# Patient Record
Sex: FEMALE
[De-identification: names, ages, dates, MRNs, and addresses within clinical notes are randomized per-mention and may not be internally consistent; named-entity substitution may affect disease eponyms.]

---

## 2017-07-06 ENCOUNTER — HOSPITAL ENCOUNTER (EMERGENCY)
Dept: HOSPITAL 14 - H.ER | Age: 62
Discharge: HOME | End: 2017-07-06
Payer: MEDICAID

## 2017-07-06 VITALS
SYSTOLIC BLOOD PRESSURE: 168 MMHG | HEART RATE: 85 BPM | DIASTOLIC BLOOD PRESSURE: 81 MMHG | RESPIRATION RATE: 18 BRPM | OXYGEN SATURATION: 96 %

## 2017-07-06 VITALS — TEMPERATURE: 97.6 F

## 2017-07-06 DIAGNOSIS — J45.901: Primary | ICD-10-CM

## 2017-07-06 LAB
BASOPHILS # BLD AUTO: 0.1 K/UL (ref 0–0.2)
BASOPHILS NFR BLD: 0.6 % (ref 0–2)
BUN SERPL-MCNC: 11 MG/DL (ref 7–17)
CALCIUM SERPL-MCNC: 9.5 MG/DL (ref 8.4–10.2)
EOSINOPHIL # BLD AUTO: 0.2 K/UL (ref 0–0.7)
EOSINOPHIL NFR BLD: 2.5 % (ref 0–4)
ERYTHROCYTE [DISTWIDTH] IN BLOOD BY AUTOMATED COUNT: 13.7 % (ref 11.5–14.5)
GFR NON-AFRICAN AMERICAN: > 60
HGB BLD-MCNC: 13.1 G/DL (ref 12–16)
LYMPHOCYTES # BLD AUTO: 3 K/UL (ref 1–4.3)
LYMPHOCYTES NFR BLD AUTO: 33.4 % (ref 20–40)
MCH RBC QN AUTO: 30 PG (ref 27–31)
MCHC RBC AUTO-ENTMCNC: 33.3 G/DL (ref 33–37)
MCV RBC AUTO: 89.9 FL (ref 81–99)
MONOCYTES # BLD: 0.6 K/UL (ref 0–0.8)
MONOCYTES NFR BLD: 6.2 % (ref 0–10)
NEUTROPHILS # BLD: 5.2 K/UL (ref 1.8–7)
NEUTROPHILS NFR BLD AUTO: 57.3 % (ref 50–75)
NRBC BLD AUTO-RTO: 0 % (ref 0–0)
PLATELET # BLD: 202 K/UL (ref 130–400)
PMV BLD AUTO: 10.3 FL (ref 7.2–11.7)
RBC # BLD AUTO: 4.39 MIL/UL (ref 3.8–5.2)
WBC # BLD AUTO: 9.1 K/UL (ref 4.8–10.8)

## 2017-07-06 NOTE — ED PDOC
HPI: SOB/CHF/COPD


Time Seen by Provider: 17 15:29


Chief Complaint (Nursing): Shortness Of Breath


Chief Complaint (Provider): SOB


History Per: Patient (62 Y/O FEMALE H/O COPD HERE WITH COMPLAINT OF SOB TODAY 

NOT RESOLVING DESPITE ATTEMPTED USE OF NEBULIZER.  DENIES ANY COUGH/FEVERS/

CHILLS/URI.)





Past Medical History


Reviewed: Historical Data, Nursing Documentation, Vital Signs


Vital Signs: 


 Last Vital Signs











Temp  97.6 F   17 15:02


 


Pulse  75   17 16:28


 


Resp  20   17 16:25


 


BP  151/74 H  17 16:25


 


Pulse Ox  99   17 17:16














- Medical History


PMH: Anemia (iron deficiency), Anxiety, Arthritis, Asthma, Cardia Arrhythmia, 

COPD, Migraine, Osteoporosis, Rheumatoid Arthritis


   Denies: HTN, Chronic Kidney Disease





- Surgical History


Surgical History: 





- Family History


Family History: States: No Known Family Hx





- Home Medications


Home Medications: 


 Ambulatory Orders











 Medication  Instructions  Recorded


 


Albuterol Sulfate [Albuterol 2 puff IH BID PRN 05/11/15





Sulfate Hfa]  


 


Alprazolam [Xanax] 0.5 mg PO Q12 05/11/15


 


Budesonide/Formoterol Fumarate 2 puff IH BID PRN 05/11/15





[Symbicort 160-4.5 Mcg Inhaler]  


 


Oxycodone Hydrochloride [Oxycodone] 15 mg PO BID PRN 05/11/15


 


Promethazine DM [Phenergan DM 10 ml PO QID 05/11/15





Syrup]  


 


Zolpidem Tartrate [Ambien] 5 mg PO HS PRN 05/11/15


 


predniSONE [predniSONE Tab] 5 mg PO DAILY #0 tab 05/13/15


 


Sulfamethoxazole/Trimethopri 1 tab PO BID #28 tab 12/22/15





[Bactrim Ds 800 mg-160 mg]  


 


Tobramycin 0.3% [Tobrex 0.3% Ophth 0.3 drop OU Q4H #1 bottle 16





Oint]  


 


Prednisone [Deltasone] 3 tab PO DAILY #12 tablet 17














- Allergies


Allergies/Adverse Reactions: 


 Allergies











Allergy/AdvReac Type Severity Reaction Status Date / Time


 


No Known Allergies Allergy   Verified 16 08:34














Review of Systems


ROS Statement: Except As Marked, All Systems Reviewed And Found Negative


Respiratory: Positive for: Shortness of Breath





Physical Exam





- Reviewed


Nursing Documentation Reviewed: Yes


Vital Signs Reviewed: Yes





- Physical Exam


Appears: Positive for: Well, Non-toxic, No Acute Distress


Head Exam: Positive for: ATRAUMATIC, NORMAL INSPECTION, NORMOCEPHALIC


Skin: Positive for: Normal Color, Warm, DRY


Eye Exam: Positive for: EOMI, Normal appearance, PERRL


ENT: Positive for: Normal ENT Inspection


Neck: Positive for: Normal, Painless ROM


Cardiovascular/Chest: Positive for: Regular Rate, Rhythm


Respiratory: Positive for: Normal Breath Sounds, Wheezing


Gastrointestinal/Abdominal: Positive for: Normal Exam, Bowel Sounds, Soft


Back: Positive for: Normal Inspection


Extremity: Positive for: Normal ROM


Neurologic/Psych: Positive for: Alert, Oriented





- Laboratory Results


Result Diagrams: 


 17 15:31





 17 16:15





- ECG


O2 Sat by Pulse Oximetry: 99





- Progress


ED Course And Treament: 





DUONEB X 3


SOLUMEDROL 125 MG IV X 1 DOSE





CXR: SAFETY PIN (EXTERNAL NOTED ON BLOUSE) OTHERWISE NO OBVIOUS PNEUMONIA/ETC.





RE-EVALUATED AT 17:15PM. PATIENT APPEARS MUCH IMPROVED. O2 SAT AT 99% ON LUNG 

EXAM.





D/W DR. SHARP








Disposition





- Clinical Impression


Clinical Impression: 


 Asthma exacerbation








- Patient ED Disposition


Is Patient to be Admitted: No





- Disposition


Disposition: Routine/Home


Disposition Time: 17:16


Condition: FAIR


Prescriptions: 


Prednisone [Deltasone] 3 tab PO DAILY #12 tablet


Instructions:  Asthma (ED)


Print Language: Swazi

## 2017-07-06 NOTE — RAD
HISTORY:

Shortness of breath



COMPARISON:

05/11/2015. 



FINDINGS:



LUNGS:

The lungs are clear.



PLEURA:

No significant pleural effusion identified, no pneumothorax apparent.



CARDIOVASCULAR:

The heart is normal in size.



OSSEOUS STRUCTURES:

No significant abnormalities.



VISUALIZED UPPER ABDOMEN:

Normal.



OTHER FINDINGS:

None.



IMPRESSION:

No active pulmonary disease.

## 2017-07-07 NOTE — CARD
--------------- APPROVED REPORT --------------





EKG Measurement

Heart [id number]

[place] [id number]

[id number]

[id number]

[id number]



<Conclusion>

Normal sinus rhythm with sinus arrhythmia

Normal ECG

## 2018-03-19 ENCOUNTER — HOSPITAL ENCOUNTER (EMERGENCY)
Dept: HOSPITAL 14 - H.ER | Age: 63
Discharge: HOME | End: 2018-03-19
Payer: MEDICAID

## 2018-03-19 VITALS — BODY MASS INDEX: 35.2 KG/M2

## 2018-03-19 VITALS
RESPIRATION RATE: 16 BRPM | OXYGEN SATURATION: 98 % | DIASTOLIC BLOOD PRESSURE: 77 MMHG | HEART RATE: 92 BPM | TEMPERATURE: 98.5 F | SYSTOLIC BLOOD PRESSURE: 144 MMHG

## 2018-03-19 DIAGNOSIS — M25.562: Primary | ICD-10-CM

## 2018-03-19 DIAGNOSIS — M06.9: ICD-10-CM

## 2018-03-19 DIAGNOSIS — J44.9: ICD-10-CM

## 2018-03-19 DIAGNOSIS — M81.0: ICD-10-CM

## 2018-03-19 DIAGNOSIS — F41.9: ICD-10-CM

## 2018-03-19 NOTE — RAD
PROCEDURE:  Left Knee Radiographs.



HISTORY:

Pain.



COMPARISON:

None.



FINDINGS:



BONES:

No acute fracture or destructive bony lesion identified.



JOINTS:

No subluxation or dislocation identified. Limited joint space 

narrowing seen the medial femorotibial compartment compatible 

degenerative joint disease.  There is ossification insertion of the 

quadriceps tendon at the upper patella. 



JOINT EFFUSION:

Trace suprapatellar bursa effusion noted. 



OTHER FINDINGS:

None.



IMPRESSION:

Limited degenerative changes. No acute fracture or dislocation 

identified. Trace suprasellar bursa effusion evident.

## 2018-03-19 NOTE — ED PDOC
Lower Extremity Pain/Injury


Time Seen by Provider: 18 03:06


Chief Complaint (Nursing): Lower Extremity Problem/Injury


Chief Complaint (Provider): LEft knee pain after fall 


History Per: Patient


History/Exam Limitations: no limitations


Onset/Duration Of Symptoms: Days


Current Symptoms Are (Timing): Still Present


Additional Complaint(s): 





PT states she tripped and fell which results in her twisting her left knee. 

Localized pain, lateral knee. No swelling. No medication for pain at home. 





Past Medical History


Reviewed: Historical Data, Nursing Documentation, Vital Signs


Vital Signs: 





 Last Vital Signs











Temp  98.5 F   18 03:37


 


Pulse  92 H  18 03:37


 


Resp  16   18 03:37


 


BP  144/77   18 03:37


 


Pulse Ox  98   18 03:37














- Medical History


PMH: Anemia (iron deficiency), Anxiety, Arthritis, Asthma, Cardia Arrhythmia, 

COPD, Migraine, Osteoporosis, Rheumatoid Arthritis


   Denies: HTN, Chronic Kidney Disease





- Surgical History


Surgical History: 





- Family History


Family History: States: No Known Family Hx





- Living Arrangements


Living Arrangements: With Family





- Social History


Current smoker - smoking cessation education provided: No





- Home Medications


Home Medications: 


 Ambulatory Orders











 Medication  Instructions  Recorded


 


Albuterol Sulfate [Albuterol 2 puff IH BID PRN 05/11/15





Sulfate Hfa]  


 


Alprazolam [Xanax] 0.5 mg PO Q12 05/11/15


 


Budesonide/Formoterol Fumarate 2 puff IH BID PRN 05/11/15





[Symbicort 160-4.5 Mcg Inhaler]  


 


Oxycodone Hydrochloride [Oxycodone] 15 mg PO BID PRN 05/11/15


 


Promethazine DM [Phenergan DM 10 ml PO QID 05/11/15





Syrup]  


 


Zolpidem Tartrate [Ambien] 5 mg PO HS PRN 05/11/15


 


predniSONE [predniSONE Tab] 5 mg PO DAILY #0 tab 05/13/15


 


Sulfamethoxazole/Trimethopri 1 tab PO BID #28 tab 12/22/15





[Bactrim Ds 800 mg-160 mg]  


 


Tobramycin 0.3% [Tobrex 0.3% Ophth 0.3 drop OU Q4H #1 bottle 16





Oint]  


 


Prednisone [Deltasone] 3 tab PO DAILY #12 tablet 17














- Allergies


Allergies/Adverse Reactions: 


 Allergies











Allergy/AdvReac Type Severity Reaction Status Date / Time


 


No Known Allergies Allergy   Verified 18 03:37














Review of Systems


ROS Statement: Except As Marked, All Systems Reviewed And Found Negative


Constitutional: Negative for: Fever, Chills


Musculoskeletal: Positive for: Leg Pain





Physical Exam





- Reviewed


Nursing Documentation Reviewed: Yes


Vital Signs Reviewed: Yes





- Physical Exam


Appears: Positive for: Well, Non-toxic, No Acute Distress


Head Exam: Positive for: ATRAUMATIC, NORMAL INSPECTION, NORMOCEPHALIC


Skin: Positive for: Normal Color, Warm, DRY


Eye Exam: Positive for: Normal appearance


ENT: Positive for: Normal ENT Inspection


Neck: Positive for: Normal, Painless ROM


Respiratory: Negative for: Accessory Muscle Use


Back: Positive for: Normal Inspection


Extremity: Positive for: Normal ROM, Swelling (minimal ).  Negative for: 

Tenderness, Deformity


Neurologic/Psych: Positive for: Alert, Oriented





- ECG


O2 Sat by Pulse Oximetry: 98


Pulse Ox Interpretation: Normal





Medical Decision Making


Medical Decision Making: 





No acute findings on x-ray of the knee .





Disposition





- Clinical Impression


Clinical Impression: 


 Knee pain








- Disposition


Referrals: 


Perry Boudreaux MD [Primary Care Provider] - 


Disposition: Routine/Home


Disposition Time: 04:11


Condition: GOOD


Instructions:  Knee Pain (DC)


Forms:  [facility] (English)

## 2018-07-09 ENCOUNTER — HOSPITAL ENCOUNTER (INPATIENT)
Dept: HOSPITAL 14 - H.ER | Age: 63
LOS: 3 days | Discharge: HOME | DRG: 88 | End: 2018-07-12
Attending: INTERNAL MEDICINE | Admitting: INTERNAL MEDICINE
Payer: MEDICAID

## 2018-07-09 VITALS — BODY MASS INDEX: 35.2 KG/M2

## 2018-07-09 DIAGNOSIS — F41.9: ICD-10-CM

## 2018-07-09 DIAGNOSIS — J45.909: ICD-10-CM

## 2018-07-09 DIAGNOSIS — G43.909: ICD-10-CM

## 2018-07-09 DIAGNOSIS — M06.9: ICD-10-CM

## 2018-07-09 DIAGNOSIS — M81.0: ICD-10-CM

## 2018-07-09 DIAGNOSIS — M19.90: ICD-10-CM

## 2018-07-09 DIAGNOSIS — J44.1: Primary | ICD-10-CM

## 2018-07-09 DIAGNOSIS — F17.210: ICD-10-CM

## 2018-07-09 DIAGNOSIS — I10: ICD-10-CM

## 2018-07-09 LAB
ALBUMIN SERPL-MCNC: 4.2 G/DL (ref 3.5–5)
ALBUMIN/GLOB SERPL: 1.3 {RATIO} (ref 1–2.1)
ALT SERPL-CCNC: 46 U/L (ref 9–52)
AST SERPL-CCNC: 30 U/L (ref 14–36)
BASOPHILS # BLD AUTO: 0 K/UL (ref 0–0.2)
BASOPHILS NFR BLD: 0.5 % (ref 0–2)
BUN SERPL-MCNC: 11 MG/DL (ref 7–17)
CALCIUM SERPL-MCNC: 9.8 MG/DL (ref 8.4–10.2)
EOSINOPHIL # BLD AUTO: 0.2 K/UL (ref 0–0.7)
EOSINOPHIL NFR BLD: 2.3 % (ref 0–4)
ERYTHROCYTE [DISTWIDTH] IN BLOOD BY AUTOMATED COUNT: 13.3 % (ref 11.5–14.5)
GFR NON-AFRICAN AMERICAN: > 60
HGB BLD-MCNC: 13.5 G/DL (ref 12–16)
LYMPHOCYTES # BLD AUTO: 3.5 K/UL (ref 1–4.3)
LYMPHOCYTES NFR BLD AUTO: 32.6 % (ref 20–40)
MCH RBC QN AUTO: 30.2 PG (ref 27–31)
MCHC RBC AUTO-ENTMCNC: 33.3 G/DL (ref 33–37)
MCV RBC AUTO: 90.5 FL (ref 81–99)
MONOCYTES # BLD: 0.5 K/UL (ref 0–0.8)
MONOCYTES NFR BLD: 4.9 % (ref 0–10)
NEUTROPHILS # BLD: 6.3 K/UL (ref 1.8–7)
NEUTROPHILS NFR BLD AUTO: 59.7 % (ref 50–75)
NRBC BLD AUTO-RTO: 0 % (ref 0–0)
PLATELET # BLD: 231 K/UL (ref 130–400)
PMV BLD AUTO: 9.3 FL (ref 7.2–11.7)
RBC # BLD AUTO: 4.48 MIL/UL (ref 3.8–5.2)
WBC # BLD AUTO: 10.6 K/UL (ref 4.8–10.8)

## 2018-07-09 NOTE — ED PDOC
History of Present Illness


History of Present Illness: 


63yo female, sent to ER by Dr. Boudreaux for evaluation as she has been coughing 

for the apst 6 days. Patient states she feels an itch in her throat that makes 

her want to cough. Patient was placed on a course of zithromax (started ), 

cetirizine and promethazine DM without relief of symptoms. Otherwise, patient 

denies any fever, chills, sputum, chest pain, or shortness of breath. 





HPI: Influenza


Time Seen by Provider: 18 18:08


Chief Complaint: Cough, Cold, Congestion


Chief Complaint (Provider): Cough


History Per: Patient


Exam Limitations: no limitations


Have you had recent travel within the past 21 days to any of: No





Past Medical History


Reviewed: Historical Data, Nursing Documentation, Vital Signs


Vital Signs: 


 Last Vital Signs











Temp  98.4 F   18 17:12


 


Pulse  86   18 17:12


 


Resp  18   18 17:12


 


BP  156/76 H  18 17:12


 


Pulse Ox  100   18 17:12














- Medical History


PMH: Anemia (iron deficiency), Anxiety, Arthritis, Asthma, Cardia Arrhythmia, 

COPD, Migraine, Osteoporosis, Rheumatoid Arthritis


   Denies: HTN, Chronic Kidney Disease





- Surgical History


Surgical History: 





- Family History


Family History: States: No Known Family Hx





- Living Arrangements


Living Arrangements: With Family





- Home Medications


Home Medications: 


 Ambulatory Orders











 Medication  Instructions  Recorded


 


Albuterol Sulfate [Albuterol 2 puff IH BID PRN 05/11/15





Sulfate Hfa]  


 


Promethazine DM [Phenergan DM 10 ml PO QID 05/11/15





Syrup]  


 


Azithromycin [Zithromax] 500 mg PO DAILY 18


 


Levocetirizine Dihydrochloride 5 mg PO DAILY 18





[Xyzal]  














- Allergies


Allergies/Adverse Reactions: 


 Allergies











Allergy/AdvReac Type Severity Reaction Status Date / Time


 


No Known Allergies Allergy   Verified 18 17:12














Review of Systems


ROS Statement: Except As Marked, All Systems Reviewed And Found Negative


Constitutional: Negative for: Fever, Chills


Cardiovascular: Negative for: Chest Pain


Respiratory: Positive for: Cough.  Negative for: Shortness of Breath, Sputum


Gastrointestinal: Negative for: Nausea, Vomiting, Diarrhea





Physical Exam





- Reviewed


Nursing Documentation Reviewed: Yes


Vital Signs Reviewed: Yes





- Physical Exam


Appears: Positive for: Non-toxic, No Acute Distress


Head Exam: Positive for: ATRAUMATIC, NORMAL INSPECTION, NORMOCEPHALIC


Skin: Positive for: Normal Color, Warm, Dry


Eye Exam: Positive for: Normal appearance


ENT: Positive for: Pharynx Is (clear), Other (uvula midline).  Negative for: 

Pharyngeal Erythema, Tonsillar Exudate, Tonsillar Swelling


Neck: Positive for: Normal, Supple, Trachea Midline


Cardiovascular/Chest: Positive for: Regular Rate, Rhythm


Respiratory: Positive for: Wheezing (bilateral expiratory wheeze).  Negative for

: Rales, Rhonchi, Respiratory Distress


Gastrointestinal/Abdominal: Positive for: Normal Exam, Soft.  Negative for: 

Tenderness


Back: Positive for: Normal Inspection


Extremity: Positive for: Normal ROM.  Negative for: Pedal Edema


Neurologic/Psych: Positive for: Alert, Oriented





Medical Decision Making


Medical Decision Making: 


Impression: Cough, asthma exacerbation


Plan:


-- Chest x-ray


-- Duoneb 3ml INH


-- Solumedrol 125mg IVP


-- Labs


-- EKG





Accession No. : [id number]


Patient Name / ID : JASON STACY  / [id number]


Exam Date : 2018 18:22:38 ( Approved )


Study Comment : 


Sex / Age : F  / 62Y





Creator : braxton nagy


Dictator : Severiano Barnes MD


 : 


Approver : Severiano Barnes MD


Approver2 : 





Report Date : 2018 18:42:11


My Comment : 


********************************************************************************

***





HISTORY:


Cough X 6 days  





COMPARISON:


Chest radiograph dated 2017





TECHNIQUE:


Chest PA and lateral





FINDINGS:





LUNGS:


Left basilar atelectasis versus infiltrate.





PLEURA:


No significant pleural effusion identified. No pneumothorax apparent.





CARDIOVASCULAR:


Atherosclerotic aortic calcifications.  Cardiomediastinal silhouette within 

normal limits.





OSSEOUS STRUCTURES:


Unchanged.





VISUALIZED UPPER ABDOMEN:


Normal.





OTHER FINDINGS:


None.





IMPRESSION:


Left basilar atelectasis versus infiltrate.





Scribe Attestation:


Documented by Renay Reeder, acting as a scribe for Rica Wilson MD.


 


Provider Scribe Attestation:


All medical record entries made by the Scribe were at my direction and 

personally dictated by me. I have reviewed the chart and agree that the record 

accurately reflects my personal performance of the history, physical exam, 

medical decision making, and the department course for this patient. I have 

also personally directed, reviewed, and agree with the discharge instructions 

and disposition.





- Laboratory Results


Result Diagrams: 


 18 06:25





 18 06:25





- ECG


O2 Sat by Pulse Oximetry: 100





Disposition





- Clinical Impression


Clinical Impression: 


 Pneumonia, Asthma exacerbation








- Patient ED Disposition


Is Patient to be Admitted: Yes





- Disposition


Disposition Time: 19:08


Condition: STABLE





- Pt Status Changed To:


Hospital Disposition Of: Inpatient





- Admit Certification


Admit to Inpatient:: After my assessment, the patient will require 

hospitalization for at least two midnights.  This is because of the severity of 

symptoms shown, intensity of services needed, and/or the medical risk in this 

patient being treated as an outpatient.





- POA


Present On Arrival: None

## 2018-07-09 NOTE — RAD
HISTORY:

Cough X 6 days  



COMPARISON:

Chest radiograph dated 07/06/2017



TECHNIQUE:

Chest PA and lateral



FINDINGS:



LUNGS:

Left basilar atelectasis versus infiltrate.



PLEURA:

No significant pleural effusion identified. No pneumothorax apparent.



CARDIOVASCULAR:

Atherosclerotic aortic calcifications.  Cardiomediastinal silhouette 

within normal limits.



OSSEOUS STRUCTURES:

Unchanged.



VISUALIZED UPPER ABDOMEN:

Normal.



OTHER FINDINGS:

None.



IMPRESSION:

Left basilar atelectasis versus infiltrate.

## 2018-07-10 LAB
ALBUMIN SERPL-MCNC: 4.1 G/DL (ref 3.5–5)
ALBUMIN/GLOB SERPL: 1.2 {RATIO} (ref 1–2.1)
ALT SERPL-CCNC: 36 U/L (ref 9–52)
ARTERIAL BLOOD GAS HEMOGLOBIN: 14.2 G/DL (ref 11.7–17.4)
ARTERIAL BLOOD GAS O2 SAT: 96.4 % (ref 95–98)
ARTERIAL BLOOD GAS PCO2: 34 MM/HG (ref 35–45)
ARTERIAL BLOOD GAS TCO2: 24.6 MMOL/L (ref 22–28)
ARTERIAL PATENCY WRIST A: YES
AST SERPL-CCNC: 27 U/L (ref 14–36)
BILIRUB UR-MCNC: NEGATIVE MG/DL
BUN SERPL-MCNC: 14 MG/DL (ref 7–17)
CALCIUM SERPL-MCNC: 9.8 MG/DL (ref 8.4–10.2)
COLOR UR: YELLOW
ERYTHROCYTE [DISTWIDTH] IN BLOOD BY AUTOMATED COUNT: 13.3 % (ref 11.5–14.5)
GFR NON-AFRICAN AMERICAN: > 60
GLUCOSE UR STRIP-MCNC: >=500 MG/DL
HCO3 BLDA-SCNC: 25 MMOL/L (ref 21–28)
HDLC SERPL-MCNC: 42 MG/DL (ref 30–70)
HGB BLD-MCNC: 13.3 G/DL (ref 12–16)
INHALED O2 CONCENTRATION: 21 %
LDLC SERPL-MCNC: 70 MG/DL (ref 0–129)
LEUKOCYTE ESTERASE UR-ACNC: (no result) LEU/UL
MCH RBC QN AUTO: 30.3 PG (ref 27–31)
MCHC RBC AUTO-ENTMCNC: 34.5 G/DL (ref 33–37)
MCV RBC AUTO: 88 FL (ref 81–99)
O2 CAP BLDA-SCNC: 19.3 ML/DL (ref 16–24)
O2 CT BLDA-SCNC: 18.6 ML/DL (ref 15–23)
PH BLDA: 7.45 [PH] (ref 7.35–7.45)
PH UR STRIP: 6 [PH] (ref 5–8)
PLATELET # BLD: 237 K/UL (ref 130–400)
PO2 BLDA: 69 MM/HG (ref 80–100)
PROT UR STRIP-MCNC: NEGATIVE MG/DL
RBC # BLD AUTO: 4.39 MIL/UL (ref 3.8–5.2)
RBC # UR STRIP: NEGATIVE /UL
SP GR UR STRIP: 1.03 (ref 1–1.03)
SQUAMOUS EPITHIAL: 1 /HPF (ref 0–5)
URINE CLARITY: CLEAR
UROBILINOGEN UR-MCNC: (no result) MG/DL (ref 0.2–1)
WBC # BLD AUTO: 10 K/UL (ref 4.8–10.8)

## 2018-07-10 RX ADMIN — WATER SCH MLS/HR: 1 INJECTION INTRAMUSCULAR; INTRAVENOUS; SUBCUTANEOUS at 09:12

## 2018-07-10 RX ADMIN — WATER SCH MLS/HR: 1 INJECTION INTRAMUSCULAR; INTRAVENOUS; SUBCUTANEOUS at 04:35

## 2018-07-10 RX ADMIN — IPRATROPIUM BROMIDE AND ALBUTEROL SULFATE SCH ML: .5; 3 SOLUTION RESPIRATORY (INHALATION) at 19:00

## 2018-07-10 RX ADMIN — IPRATROPIUM BROMIDE AND ALBUTEROL SULFATE SCH ML: .5; 3 SOLUTION RESPIRATORY (INHALATION) at 04:18

## 2018-07-10 RX ADMIN — IPRATROPIUM BROMIDE AND ALBUTEROL SULFATE SCH ML: .5; 3 SOLUTION RESPIRATORY (INHALATION) at 07:25

## 2018-07-10 RX ADMIN — IPRATROPIUM BROMIDE AND ALBUTEROL SULFATE SCH ML: .5; 3 SOLUTION RESPIRATORY (INHALATION) at 11:40

## 2018-07-10 RX ADMIN — METHYLPREDNISOLONE SODIUM SUCCINATE SCH MG: 40 INJECTION, POWDER, FOR SOLUTION INTRAMUSCULAR; INTRAVENOUS at 04:35

## 2018-07-10 RX ADMIN — METHYLPREDNISOLONE SODIUM SUCCINATE SCH MG: 40 INJECTION, POWDER, FOR SOLUTION INTRAMUSCULAR; INTRAVENOUS at 21:30

## 2018-07-10 RX ADMIN — METHYLPREDNISOLONE SODIUM SUCCINATE SCH MG: 40 INJECTION, POWDER, FOR SOLUTION INTRAMUSCULAR; INTRAVENOUS at 17:34

## 2018-07-10 RX ADMIN — IPRATROPIUM BROMIDE AND ALBUTEROL SULFATE SCH ML: .5; 3 SOLUTION RESPIRATORY (INHALATION) at 22:59

## 2018-07-10 RX ADMIN — LEVOFLOXACIN SCH MLS/HR: 5 INJECTION, SOLUTION INTRAVENOUS at 09:10

## 2018-07-10 RX ADMIN — METHYLPREDNISOLONE SODIUM SUCCINATE SCH MG: 40 INJECTION, POWDER, FOR SOLUTION INTRAMUSCULAR; INTRAVENOUS at 09:12

## 2018-07-10 RX ADMIN — WATER SCH MLS/HR: 1 INJECTION INTRAMUSCULAR; INTRAVENOUS; SUBCUTANEOUS at 21:30

## 2018-07-10 RX ADMIN — PANTOPRAZOLE SODIUM SCH: 40 TABLET, DELAYED RELEASE ORAL at 13:11

## 2018-07-10 RX ADMIN — WATER SCH MLS/HR: 1 INJECTION INTRAMUSCULAR; INTRAVENOUS; SUBCUTANEOUS at 17:34

## 2018-07-10 RX ADMIN — IPRATROPIUM BROMIDE AND ALBUTEROL SULFATE SCH ML: .5; 3 SOLUTION RESPIRATORY (INHALATION) at 15:24

## 2018-07-10 NOTE — CT
Date of service: 



07/10/2018



PROCEDURE:  CT Chest without contrast



HISTORY:

Pneumonia/asthma exacerbation



COMPARISON:

None.



TECHNIQUE:

Contiguous axial images were obtained through the chest without 

intravenous contrast enhancement. Sagittal and coronal 

reconstructions were performed.







Radiation dose (DLP): 396.2 mGy-cm. 



This CT exam was performed using one or more of the following dose 

reduction techniques: Automated exposure control, adjustment of the 

mA and/or kV according to patient size, and/or use of iterative 

reconstruction technique.



FINDINGS:



LUNGS:

Medial right upper lobe and mild bilateral lower lobe atelectasis. 

Focal consolidation. Visualized airway clear. 



MEDIASTINUM:

Unremarkable thoracic aorta. No aneurysm. Normal sized heart. Main 

pulmonary artery unremarkable. No vascular congestion. No 

lymphadenopathy.



PLEURA:

No pleural fluid. No pneumothorax.



BONES:

No fracture. No destructive lesion. 



UPPER ABDOMEN:

Grossly unremarkable.



OTHER FINDINGS:

None.



IMPRESSION:

Medial right upper lobe subsegmental and mild bilateral lower lobe 

atelectasis. No focal consolidation or pleural effusion.

## 2018-07-10 NOTE — CP.PCM.HP
History of Present Illness





- History of Present Illness


History of Present Illness: 





CC:  Respiratory Distress.





63 y/o F, Hx COPD, Asthma[facility] 

persistent cough, non productive associated to SOB, chest congestion for  6 day 

PTA,  Pt using Zithromax, Certizine, Prometazine DM with no relief.





Worsening symptoms:  CAM.





Aggravated factor:  movements/walking.





Pt denied:  Fever, chills, n/v/d, abdominal pain, urinary symptoms, CP, 

palpitations, syncope, dizziness, sick contact, recent travel out of [place].





CXR:  L basilar atelectasis vs infiltrate.





Chest CT:  Medial RUL and mid b/l lower lobe atelectasis.





Present on Admission





- Present on Admission


Any Indicators Present on Admission: No





Review of Systems





- Constitutional


Constitutional: Other (negative)





- EENT


Eyes: Requires Corrective Lenses


Ears: Other (negative)


Nose/Mouth/Throat: Other (negative)





- Cardiovascular


Cardiovascular: Other (negative)





- Respiratory


Respiratory: Cough, Dyspnea, Wheezing, Chest Congestion





- Gastrointestinal


Gastrointestinal: Other (negative)





- Musculoskeletal


Musculoskeletal: Arthralgias





- Integumentary


Integumentary: Other (negative)





- Neurological


Neurological: Other (negative)





- Psychiatric


Psychiatric: Other (negative)





- Endocrine


Endocrine: Other (negative)





- Hematologic/Lymphatic


Hematologic: Other (negative)





Past Patient History





- Infectious Disease


Hx of Infectious Diseases: None





- Past Medical History & Family History


Past Medical History?: Yes


Pertinent Family History: 





Asthma, Ca, DM, HTN, KD,





- Past Social History


Smoking Status: Heavy Smoker > 10 Cigarettes Daily


Alcohol: None


Drugs: Denies


Home Situation {Lives}: With Family





- CARDIAC


Hx Cardiac Disorders: Yes


Hx Cardia Arrhythmia: Yes


Hx Hypertension: No





- PULMONARY


Hx Respiratory Disorders: Yes


Hx Asthma: Yes


Hx Chronic Obstructive Pulmonary Disease (COPD): Yes





- NEUROLOGICAL


Hx Neurological Disorder: Yes


Hx Migraine: Yes





- HEENT


Hx HEENT Problems: No





- RENAL


Hx Chronic Kidney Disease: No





- ENDOCRINE/METABOLIC


Hx Endocrine Disorders: No





- HEMATOLOGICAL/ONCOLOGICAL


Hx Blood Disorders: Yes


Hx Anemia: Yes (iron deficiency)





- INTEGUMENTARY


Hx Dermatological Problems: No





- MUSCULOSKELETAL/RHEUMATOLOGICAL


Hx Musculoskeletal Disorders: Yes


Hx Falls: Yes





- GASTROINTESTINAL


Hx Gastrointestinal Disorders: Yes


Hx Colitis: Yes


Hx Hemorrhoids: Yes (Internal)





- GENITOURINARY/GYNECOLOGICAL


Hx Genitourinary Disorders: Yes


Other/Comment: Microscopic Hematuria





- PSYCHIATRIC


Hx Psychophysiologic Disorder: No


Hx Substance Use: No





- SURGICAL HISTORY


Hx Surgeries: Yes


Hx Hysterectomy: Yes





- ANESTHESIA


Hx Anesthesia: Yes


Hx Anesthesia Reactions: No


Hx Malignant Hyperthermia: No





Meds


Allergies/Adverse Reactions: 


 Allergies











Allergy/AdvReac Type Severity Reaction Status Date / Time


 


No Known Allergies Allergy   Verified 07/09/18 17:12














Physical Exam





- Constitutional


Appears: No Acute Distress





- Head Exam


Head Exam: NORMAL INSPECTION





- Eye Exam


Eye Exam: PERRL





- ENT Exam


ENT Exam: Mucous Membranes Moist





- Neck Exam


Neck exam: Positive for: Normal Inspection





- Respiratory Exam


Respiratory Exam: Decreased Breath Sounds (at bases), Rhonchi (scattered), 

Wheezes





- Cardiovascular Exam


Cardiovascular Exam: REGULAR RHYTHM





- GI/Abdominal Exam


GI & Abdominal Exam: Normal Bowel Sounds, Soft





- Extremities Exam


Extremities exam: Positive for: normal inspection





- Back Exam


Back exam: NORMAL INSPECTION





- Neurological Exam


Neurological exam: Alert, Oriented x3


Additional comments: 





No motor/sensory deficit.





- Psychiatric Exam


Psychiatric exam: Normal Mood





- Skin


Skin Exam: Warm





Results





- Vital Signs


Recent Vital Signs: 





 Last Vital Signs











Temp  98 F   07/10/18 08:17


 


Pulse  87   07/10/18 08:17


 


Resp  20   07/10/18 08:17


 


BP  159/79 H  07/10/18 08:17


 


Pulse Ox  95   07/10/18 08:17








reviewed J.ELDA





- Labs


Result Diagrams: 


 07/10/18 06:05





 07/10/18 06:05


Labs: 





 Laboratory Results - last 24 hr











  07/09/18 07/09/18 07/10/18





  18:43 18:43 04:20


 


WBC  10.6  


 


RBC  4.48  


 


Hgb  13.5  


 


Hct  40.6  


 


MCV  90.5  


 


MCH  30.2  


 


MCHC  33.3  


 


RDW  13.3  


 


Plt Count  231  


 


MPV  9.3  


 


Neut % (Auto)  59.7  


 


Lymph % (Auto)  32.6  


 


Mono % (Auto)  4.9  


 


Eos % (Auto)  2.3  


 


Baso % (Auto)  0.5  


 


Neut # (Auto)  6.3  


 


Lymph # (Auto)  3.5  


 


Mono # (Auto)  0.5  


 


Eos # (Auto)  0.2  


 


Baso # (Auto)  0.0  


 


pCO2    34 L


 


pO2    69 L


 


HCO3    25.0


 


ABG pH    7.45


 


ABG Total CO2    24.6


 


ABG O2 Saturation    96.4


 


ABG O2 Content    18.6


 


ABG Base Excess    0.2


 


ABG Hemoglobin    14.2


 


ABG Carboxyhemoglobin    1.7 H


 


POC ABG HHb (Measured)    3.5


 


ABG Methemoglobin    1.5


 


ABG O2 Capacity    19.3


 


Celestine Test    Yes


 


A-a O2 Difference    38.0


 


Hgb O2 Saturation    93.3 L


 


FiO2    21.0


 


Sodium   140 


 


Potassium   3.7 


 


Chloride   104 


 


Carbon Dioxide   25 


 


Anion Gap   15 


 


BUN   11 


 


Creatinine   0.5 L 


 


Est GFR ( Amer)   > 60 


 


Est GFR (Non-Af Amer)   > 60 


 


Random Glucose   185 H 


 


Calcium   9.8 


 


Total Bilirubin   0.3 


 


AST   30 


 


ALT   46 


 


Alkaline Phosphatase   72 


 


Total Protein   7.4 


 


Albumin   4.2 


 


Globulin   3.1 


 


Albumin/Globulin Ratio   1.3 


 


Triglycerides   


 


Cholesterol   


 


LDL Cholesterol Direct   


 


HDL Cholesterol   


 


TSH 3rd Generation   


 


Urine Color   


 


Urine Clarity   


 


Urine pH   


 


Ur Specific Gravity   


 


Urine Protein   


 


Urine Glucose (UA)   


 


Urine Ketones   


 


Urine Blood   


 


Urine Nitrate   


 


Urine Bilirubin   


 


Urine Urobilinogen   


 


Ur Leukocyte Esterase   


 


Urine RBC (Auto)   


 


Urine Microscopic WBC   


 


Ur Squamous Epith Cells   














  07/10/18 07/10/18 07/10/18





  06:05 06:05 09:00


 


WBC  10.0  


 


RBC  4.39  


 


Hgb  13.3  


 


Hct  38.6  


 


MCV  88.0  D  


 


MCH  30.3  


 


MCHC  34.5  


 


RDW  13.3  


 


Plt Count  237  


 


MPV   


 


Neut % (Auto)   


 


Lymph % (Auto)   


 


Mono % (Auto)   


 


Eos % (Auto)   


 


Baso % (Auto)   


 


Neut # (Auto)   


 


Lymph # (Auto)   


 


Mono # (Auto)   


 


Eos # (Auto)   


 


Baso # (Auto)   


 


pCO2   


 


pO2   


 


HCO3   


 


ABG pH   


 


ABG Total CO2   


 


ABG O2 Saturation   


 


ABG O2 Content   


 


ABG Base Excess   


 


ABG Hemoglobin   


 


ABG Carboxyhemoglobin   


 


POC ABG HHb (Measured)   


 


ABG Methemoglobin   


 


ABG O2 Capacity   


 


Celestine Test   


 


A-a O2 Difference   


 


Hgb O2 Saturation   


 


FiO2   


 


Sodium   140 


 


Potassium   4.1 


 


Chloride   105 


 


Carbon Dioxide   23 


 


Anion Gap   16 


 


BUN   14 


 


Creatinine   0.6 L 


 


Est GFR ( Amer)   > 60 


 


Est GFR (Non-Af Amer)   > 60 


 


Random Glucose   261 H 


 


Calcium   9.8 


 


Total Bilirubin   0.3 


 


AST   27 


 


ALT   36 


 


Alkaline Phosphatase   71 


 


Total Protein   7.5 


 


Albumin   4.1 


 


Globulin   3.4 


 


Albumin/Globulin Ratio   1.2 


 


Triglycerides   59  D 


 


Cholesterol   143 


 


LDL Cholesterol Direct   70 


 


HDL Cholesterol   42 


 


TSH 3rd Generation   0.57 


 


Urine Color    Yellow


 


Urine Clarity    Clear


 


Urine pH    6.0


 


Ur Specific Gravity    1.026


 


Urine Protein    Negative


 


Urine Glucose (UA)    >=500


 


Urine Ketones    Trace


 


Urine Blood    Negative


 


Urine Nitrate    Negative


 


Urine Bilirubin    Negative


 


Urine Urobilinogen    0.2-1.0


 


Ur Leukocyte Esterase    Neg


 


Urine RBC (Auto)    2


 


Urine Microscopic WBC    1


 


Ur Squamous Epith Cells    1








reviewed J.P.





- Imaging and Cardiology


  ** Chest x-ray


Status: Report reviewed by me (HUSSAIN)





  ** CT scan - chest


Status: Report reviewed by me (HUSSAIN)





Assessment & Plan


(1) COPD exacerbation


Status: Acute   Priority: High   





(2) HTN (hypertension)


Status: Acute   Priority: High   





- Assessment and Plan (Free Text)


Plan: 





F/U EKG, NC 2 L/M, Blood C-S, U C-S, Throat C-S, continue Zosyn, Levaquin, 

Duoneb, Solu-Medrol and rest of Tx.





- Date & Time


Date: 07/10/18


Time: 10:00

## 2018-07-11 VITALS — RESPIRATION RATE: 20 BRPM

## 2018-07-11 LAB
BUN SERPL-MCNC: 15 MG/DL (ref 7–17)
CALCIUM SERPL-MCNC: 9.8 MG/DL (ref 8.4–10.2)
ERYTHROCYTE [DISTWIDTH] IN BLOOD BY AUTOMATED COUNT: 13.4 % (ref 11.5–14.5)
GFR NON-AFRICAN AMERICAN: > 60
HGB BLD-MCNC: 12.9 G/DL (ref 12–16)
MCH RBC QN AUTO: 30 PG (ref 27–31)
MCHC RBC AUTO-ENTMCNC: 33.5 G/DL (ref 33–37)
MCV RBC AUTO: 89.6 FL (ref 81–99)
PLATELET # BLD: 246 K/UL (ref 130–400)
RBC # BLD AUTO: 4.3 MIL/UL (ref 3.8–5.2)
WBC # BLD AUTO: 20.7 K/UL (ref 4.8–10.8)

## 2018-07-11 RX ADMIN — IPRATROPIUM BROMIDE AND ALBUTEROL SULFATE SCH ML: .5; 3 SOLUTION RESPIRATORY (INHALATION) at 04:51

## 2018-07-11 RX ADMIN — ENOXAPARIN SODIUM SCH MG: 40 INJECTION SUBCUTANEOUS at 10:07

## 2018-07-11 RX ADMIN — METHYLPREDNISOLONE SODIUM SUCCINATE SCH MG: 40 INJECTION, POWDER, FOR SOLUTION INTRAMUSCULAR; INTRAVENOUS at 03:52

## 2018-07-11 RX ADMIN — METHYLPREDNISOLONE SODIUM SUCCINATE SCH MG: 40 INJECTION, POWDER, FOR SOLUTION INTRAMUSCULAR; INTRAVENOUS at 16:48

## 2018-07-11 RX ADMIN — WATER SCH MLS/HR: 1 INJECTION INTRAMUSCULAR; INTRAVENOUS; SUBCUTANEOUS at 03:52

## 2018-07-11 RX ADMIN — IPRATROPIUM BROMIDE AND ALBUTEROL SULFATE SCH ML: .5; 3 SOLUTION RESPIRATORY (INHALATION) at 23:00

## 2018-07-11 RX ADMIN — PANTOPRAZOLE SODIUM SCH MG: 40 TABLET, DELAYED RELEASE ORAL at 10:06

## 2018-07-11 RX ADMIN — IPRATROPIUM BROMIDE AND ALBUTEROL SULFATE SCH ML: .5; 3 SOLUTION RESPIRATORY (INHALATION) at 19:16

## 2018-07-11 RX ADMIN — IPRATROPIUM BROMIDE AND ALBUTEROL SULFATE SCH ML: .5; 3 SOLUTION RESPIRATORY (INHALATION) at 15:59

## 2018-07-11 RX ADMIN — LEVOFLOXACIN SCH MLS/HR: 5 INJECTION, SOLUTION INTRAVENOUS at 11:05

## 2018-07-11 RX ADMIN — METHYLPREDNISOLONE SODIUM SUCCINATE SCH MG: 40 INJECTION, POWDER, FOR SOLUTION INTRAMUSCULAR; INTRAVENOUS at 10:07

## 2018-07-11 RX ADMIN — IPRATROPIUM BROMIDE AND ALBUTEROL SULFATE SCH ML: .5; 3 SOLUTION RESPIRATORY (INHALATION) at 11:36

## 2018-07-11 RX ADMIN — WATER SCH MLS/HR: 1 INJECTION INTRAMUSCULAR; INTRAVENOUS; SUBCUTANEOUS at 10:06

## 2018-07-11 RX ADMIN — IPRATROPIUM BROMIDE AND ALBUTEROL SULFATE SCH ML: .5; 3 SOLUTION RESPIRATORY (INHALATION) at 07:56

## 2018-07-11 NOTE — CP.PCM.PN
Subjective





- Date & Time of Evaluation


Date of Evaluation: 07/11/18


Time of Evaluation: 13:25





- Subjective


Subjective: 





F/U  COPD Exacerbation





Objective





- Vital Signs/Intake and Output


Vital Signs (last 24 hours): 


 











Temp Pulse Resp BP Pulse Ox


 


 98.2 F   87   20   157/71 H  97 


 


 07/11/18 16:30  07/11/18 16:30  07/11/18 16:30  07/11/18 16:30  07/11/18 16:30











- Medications


Medications: 


 Current Medications





Albuterol/Ipratropium (Duoneb 3 Mg/0.5 Mg (3 Ml) Ud)  3 ml INH RQ4 [facility]


   Last Admin: 07/11/18 15:59 Dose:  3 ml


Enalapril Maleate (Vasotec)  5 mg PO DAILY [facility]


   Last Admin: 07/11/18 10:08 Dose:  5 mg


Enoxaparin Sodium (Lovenox)  40 mg SC DAILY [facility]


   PRN Reason: Protocol


   Last Admin: 07/11/18 10:07 Dose:  40 mg


Levofloxacin/Dextrose (Levaquin 750mg)  750 mg in 150 mls @ 100 mls/hr IVPB 

DAILY [facility]


   Last Admin: 07/11/18 11:05 Dose:  100 mls/hr


Methylprednisolone (Solu-Medrol)  30 mg IVP Q8 [facility]


   Last Admin: 07/11/18 16:48 Dose:  30 mg


Pantoprazole Sodium (Protonix Ec Tab)  40 mg PO DAILY [facility]


   Last Admin: 07/11/18 10:06 Dose:  40 mg


Promethazine HCl/Codeine (Phenergan/Codeine Oral Syrup)  5 ml PO Q4 PRN


   PRN Reason: Cough


   Last Admin: 07/10/18 09:18 Dose:  5 ml











- Labs


Labs: 


 





 07/11/18 06:25 





 07/11/18 06:25 











- Constitutional


Appears: No Acute Distress





- Head Exam


Head Exam: NORMAL INSPECTION





- Eye Exam


Eye Exam: PERRL





- ENT Exam


ENT Exam: Mucous Membranes Moist





- Neck Exam


Neck Exam: Normal Inspection





- Respiratory Exam


Respiratory Exam: Decreased Breath Sounds (at bases), Rhonchi (scattered)





- Cardiovascular Exam


Cardiovascular Exam: REGULAR RHYTHM





- GI/Abdominal Exam


GI & Abdominal Exam: Soft, Normal Bowel Sounds





- Extremities Exam


Extremities Exam: Normal Inspection





- Back Exam


Back Exam: NORMAL INSPECTION





- Neurological Exam


Neurological Exam: Alert, Oriented x3.  absent: Motor Sensory Deficit





- Psychiatric Exam


Psychiatric exam: Normal Mood





- Skin


Skin Exam: Warm





Assessment and Plan


(1) COPD exacerbation


Status: Acute   





(2) HTN (hypertension)


Status: Acute

## 2018-07-12 VITALS
SYSTOLIC BLOOD PRESSURE: 155 MMHG | TEMPERATURE: 97.9 F | HEART RATE: 92 BPM | DIASTOLIC BLOOD PRESSURE: 78 MMHG | OXYGEN SATURATION: 96 %

## 2018-07-12 LAB
BUN SERPL-MCNC: 15 MG/DL (ref 7–17)
CALCIUM SERPL-MCNC: 9.8 MG/DL (ref 8.4–10.2)
ERYTHROCYTE [DISTWIDTH] IN BLOOD BY AUTOMATED COUNT: 13.1 % (ref 11.5–14.5)
GFR NON-AFRICAN AMERICAN: > 60
HGB BLD-MCNC: 13.3 G/DL (ref 12–16)
MCH RBC QN AUTO: 30.4 PG (ref 27–31)
MCHC RBC AUTO-ENTMCNC: 34.5 G/DL (ref 33–37)
MCV RBC AUTO: 88.3 FL (ref 81–99)
PLATELET # BLD: 248 K/UL (ref 130–400)
RBC # BLD AUTO: 4.39 MIL/UL (ref 3.8–5.2)
WBC # BLD AUTO: 15.8 K/UL (ref 4.8–10.8)

## 2018-07-12 RX ADMIN — IPRATROPIUM BROMIDE AND ALBUTEROL SULFATE SCH ML: .5; 3 SOLUTION RESPIRATORY (INHALATION) at 05:06

## 2018-07-12 RX ADMIN — METHYLPREDNISOLONE SODIUM SUCCINATE SCH MG: 40 INJECTION, POWDER, FOR SOLUTION INTRAMUSCULAR; INTRAVENOUS at 00:17

## 2018-07-12 RX ADMIN — IPRATROPIUM BROMIDE AND ALBUTEROL SULFATE SCH ML: .5; 3 SOLUTION RESPIRATORY (INHALATION) at 11:47

## 2018-07-12 RX ADMIN — LEVOFLOXACIN SCH MLS/HR: 5 INJECTION, SOLUTION INTRAVENOUS at 08:58

## 2018-07-12 RX ADMIN — METHYLPREDNISOLONE SODIUM SUCCINATE SCH MG: 40 INJECTION, POWDER, FOR SOLUTION INTRAMUSCULAR; INTRAVENOUS at 09:00

## 2018-07-12 RX ADMIN — PANTOPRAZOLE SODIUM SCH MG: 40 TABLET, DELAYED RELEASE ORAL at 08:59

## 2018-07-12 RX ADMIN — IPRATROPIUM BROMIDE AND ALBUTEROL SULFATE SCH ML: .5; 3 SOLUTION RESPIRATORY (INHALATION) at 08:22

## 2018-07-12 RX ADMIN — ENOXAPARIN SODIUM SCH MG: 40 INJECTION SUBCUTANEOUS at 08:59

## 2018-07-12 NOTE — CARD
--------------- APPROVED REPORT --------------





Date of service: 07/09/2018



EKG Measurement

Heart [id number]

[id number]

[id number]

[id number]

[id number]



<Conclusion>

Normal sinus rhythm

Normal ECG

## 2018-07-12 NOTE — CP.PCM.DIS
Provider





- Provider


Date of Admission: 


07/09/18 19:08





Attending physician: 


Perry Boudreaux MD





Time Spent in preparation of Discharge (in minutes): 35





Diagnosis





- Discharge Diagnosis


(1) COPD exacerbation


Status: Acute   Priority: High   





(2) HTN (hypertension)


Status: Acute   Priority: High   





Hospital Course





- Lab Results


Lab Results: 


 Micro Results





07/09/18 18:55   Blood-Venous   Blood Culture - Preliminary


                            NO GROWTH AFTER 3 DAYS


07/09/18 18:43   Blood-Venous   Blood Culture - Preliminary


                            NO GROWTH AFTER 3 DAYS


07/10/18 09:13   Throat   Group A Strep Throat Culture - Final


                            NO BETA STREP GROUP A ISOLATED.


07/10/18 09:00   Urine   Urine Culture - Final


                            No Growth (<1,000 CFU/ML)





 Most Recent Lab Values











WBC  15.8 K/uL (4.8-10.8)  H  07/12/18  05:45    


 


RBC  4.39 Mil/uL (3.80-5.20)   07/12/18  05:45    


 


Hgb  13.3 g/dL (12.0-16.0)   07/12/18  05:45    


 


Hct  38.7 % (34.0-47.0)   07/12/18  05:45    


 


MCV  88.3 fl (81.0-99.0)   07/12/18  05:45    


 


MCH  30.4 pg (27.0-31.0)   07/12/18  05:45    


 


MCHC  34.5 g/dL (33.0-37.0)   07/12/18  05:45    


 


RDW  13.1 % (11.5-14.5)   07/12/18  05:45    


 


Plt Count  248 K/uL (130-400)   07/12/18  05:45    


 


MPV  9.3 fl (7.2-11.7)   07/09/18  18:43    


 


Neut % (Auto)  59.7 % (50.0-75.0)   07/09/18  18:43    


 


Lymph % (Auto)  32.6 % (20.0-40.0)   07/09/18  18:43    


 


Mono % (Auto)  4.9 % (0.0-10.0)   07/09/18  18:43    


 


Eos % (Auto)  2.3 % (0.0-4.0)   07/09/18  18:43    


 


Baso % (Auto)  0.5 % (0.0-2.0)   07/09/18  18:43    


 


Neut # (Auto)  6.3 K/uL (1.8-7.0)   07/09/18  18:43    


 


Lymph # (Auto)  3.5 K/uL (1.0-4.3)   07/09/18  18:43    


 


Mono # (Auto)  0.5 K/uL (0.0-0.8)   07/09/18  18:43    


 


Eos # (Auto)  0.2 K/uL (0.0-0.7)   07/09/18  18:43    


 


Baso # (Auto)  0.0 K/uL (0.0-0.2)   07/09/18  18:43    


 


pCO2  34 mm/Hg (35-45)  L  07/10/18  04:20    


 


pO2  69 mm/Hg ()  L  07/10/18  04:20    


 


HCO3  25.0 mmol/L (21-28)   07/10/18  04:20    


 


ABG pH  7.45  (7.35-7.45)   07/10/18  04:20    


 


ABG Total CO2  24.6 mmol/L (22-28)   07/10/18  04:20    


 


ABG O2 Saturation  96.4 % (95-98)   07/10/18  04:20    


 


ABG O2 Content  18.6 ML/dL (15-23)   07/10/18  04:20    


 


ABG Base Excess  0.2 mmol/L (-2.0-3.0)   07/10/18  04:20    


 


ABG Hemoglobin  14.2 g/dL (11.7-17.4)   07/10/18  04:20    


 


ABG Carboxyhemoglobin  1.7 % (0.5-1.5)  H  07/10/18  04:20    


 


POC ABG HHb (Measured)  3.5 % (0.0-5.0)   07/10/18  04:20    


 


ABG Methemoglobin  1.5 % (0.0-3.0)   07/10/18  04:20    


 


ABG O2 Capacity  19.3 mL/dL (16-24)   07/10/18  04:20    


 


Celestine Test  Yes   07/10/18  04:20    


 


A-a O2 Difference  38.0 mm/Hg  07/10/18  04:20    


 


Hgb O2 Saturation  93.3 % (95.0-98.0)  L  07/10/18  04:20    


 


FiO2  21.0 %  07/10/18  04:20    


 


Sodium  139 mmol/l (132-148)   07/12/18  05:45    


 


Potassium  4.3 MMOL/L (3.6-5.0)   07/12/18  05:45    


 


Chloride  102 mmol/L ()   07/12/18  05:45    


 


Carbon Dioxide  25 mmol/L (22-30)   07/12/18  05:45    


 


Anion Gap  16  (10-20)   07/12/18  05:45    


 


BUN  15 mg/dl (7-17)   07/12/18  05:45    


 


Creatinine  0.5 mg/dl (0.7-1.2)  L  07/12/18  05:45    


 


Est GFR ( Amer)  > 60   07/12/18  05:45    


 


Est GFR (Non-Af Amer)  > 60   07/12/18  05:45    


 


POC Glucose (mg/dL)  277 mg/dL ()  H  07/12/18  11:57    


 


Random Glucose  271 mg/dL ()  H  07/12/18  05:45    


 


Calcium  9.8 mg/dL (8.4-10.2)   07/12/18  05:45    


 


Total Bilirubin  0.3 mg/dl (0.2-1.3)   07/10/18  06:05    


 


AST  27 U/L (14-36)   07/10/18  06:05    


 


ALT  36 U/L (9-52)   07/10/18  06:05    


 


Alkaline Phosphatase  71 U/L ()   07/10/18  06:05    


 


C-Reactive Protein  29.00 mg/L (0.0-9.9)  H  07/10/18  06:05    


 


Total Protein  7.5 G/DL (6.3-8.2)   07/10/18  06:05    


 


Albumin  4.1 g/dL (3.5-5.0)   07/10/18  06:05    


 


Globulin  3.4 gm/dL (2.2-3.9)   07/10/18  06:05    


 


Albumin/Globulin Ratio  1.2  (1.0-2.1)   07/10/18  06:05    


 


Triglycerides  59 mg/DL (0-149)  D 07/10/18  06:05    


 


Cholesterol  143 mg/dL (0-199)   07/10/18  06:05    


 


LDL Cholesterol Direct  70 mg/dL (0-129)   07/10/18  06:05    


 


HDL Cholesterol  42 MG/DL (30-70)   07/10/18  06:05    


 


TSH 3rd Generation  0.57 mIU/ML (0.46-4.68)   07/10/18  06:05    


 


Urine Color  Yellow  (YELLOW)   07/10/18  09:00    


 


Urine Clarity  Clear  (Clear)   07/10/18  09:00    


 


Urine pH  6.0  (5.0-8.0)   07/10/18  09:00    


 


Ur Specific Gravity  1.026  (1.003-1.030)   07/10/18  09:00    


 


Urine Protein  Negative mg/dL (NEGATIVE)   07/10/18  09:00    


 


Urine Glucose (UA)  >=500 mg/dL (Normal)   07/10/18  09:00    


 


Urine Ketones  Trace mg/dL (NEGATIVE)   07/10/18  09:00    


 


Urine Blood  Negative  (NEGATIVE)   07/10/18  09:00    


 


Urine Nitrate  Negative  (NEGATIVE)   07/10/18  09:00    


 


Urine Bilirubin  Negative  (NEGATIVE)   07/10/18  09:00    


 


Urine Urobilinogen  0.2-1.0 mg/dL (0.2-1.0)   07/10/18  09:00    


 


Ur Leukocyte Esterase  Neg Subhash/uL (Negative)   07/10/18  09:00    


 


Urine RBC (Auto)  2 /hpf (0-3)   07/10/18  09:00    


 


Urine Microscopic WBC  1 /hpf (0-5)   07/10/18  09:00    


 


Ur Squamous Epith Cells  1 /hpf (0-5)   07/10/18  09:00    


 


Ur L.pneumophila Ag  Negative  (NEGATIVE)   07/10/18  07:00    


 


Mycoplasma pneumon IgG  3.51  (<=0.90)  H  07/10/18  06:05    


 


Mycoplasma pneumon IgM  3 U/mL (<770)   07/10/18  06:05    














- Date & Time of H&P


Date of H&P: 07/10/18


Time of H&P: 10:00





Discharge Exam





- Head Exam


Head Exam: NORMAL INSPECTION





- Eye Exam


Eye Exam: PERRL





- ENT Exam


ENT Exam: Mucous Membranes Moist





- Neck Exam


Neck exam: Normal Inspection





- Respiratory Exam


Respiratory Exam: Decreased Breath Sounds (at bases), Rhonchi





- Cardiovascular Exam


Cardiovascular Exam: REGULAR RHYTHM





- GI/Abdominal Exam


GI & Abdominal Exam: Normal Bowel Sounds, Soft





- Extremities Exam


Extremities exam: normal inspection





- Back Exam


Back exam: NORMAL INSPECTION





- Neurological Exam


Neurological exam: Alert, Oriented x3


Additional comments: 





No motor sensory deficit.





- Psychiatric Exam


Psychiatric exam: Normal Mood





- Skin


Skin Exam: Warm





Discharge Plan





- Discharge Medications


Prescriptions: 


Levofloxacin [Levaquin] 750 mg PO DAILY #3 tablet


Pantoprazole [Protonix EC Tab] 40 mg PO DAILY #30 ect


Prednisone [Junito] 30 mg PO DAILY #42 tablet.





- Follow Up Plan


Condition: STABLE


Disposition: HOME/ ROUTINE


Patient education suggested?: Yes


Instructions:  Asthma, Adult (DC)


Additional Instructions: 


follow up appointment with Dr Boudreaux 


Referrals: 


Perry Boudreaux MD [Family Provider] -

## 2018-07-25 NOTE — PQF
PROVIDER RESPONSE TEXT:



No Pneumonia



REVIEWER QUERY TEXT:



Pneumonia Specificity



Pneumonia is documented in the Medical Record. Please specify the type of pneumonia and the causative
 organism (includes probable or suspected)

Such as:

Type:

-- Aspiration pneumonia (please also specify the aspirate)

- Elsie (please specify cause)

- Please indicate if the aspiration is postprocedure

-- Bacterial (please document suspected or probable organism)

-- Bronchopneumonia (please document suspected or probable organism)

-- Interstitial pneumonia

-- Organizing pneumonia / BOOP

-- Pneumonia with influenza, saturnino flu, or H1N1 flu

-- RSV

-- Tuberculosis, pulmonary

-- Viral

-- Other, please specify



The patient's Clinical Indicators include:

ED Physician Documentation Report documented pneumonia and asthma exacerbation





Query created by: Anitha Shi on 2018 9:21 AM





PROVIDER RESPONSE TEXT:



Unable to determine



REVIEWER QUERY TEXT:



Asthma Specificity and Type



Asthma is documented in the Medical Record.  Please specify the type and severity of asthma and indic
ate if this is associated with exacerbation or status asthmaticus.

Such as:

-- Mild intermittent

-- Mild persistent

-- Moderate persistent

-- Severe persistent

-- Exercise induced bronchospasm

-- Cough variant asthma

-- Other, please specify



The patient's Clinical Indicators include:

ER Physician Documentation Report documented asthma exacerbation.





Query created by: Anitha Shi on 2018 9:22 AM





Electronically signed by:  Perry Boudreaux MD 2018 10:06 AM